# Patient Record
Sex: FEMALE | Race: WHITE | Employment: FULL TIME | URBAN - METROPOLITAN AREA
[De-identification: names, ages, dates, MRNs, and addresses within clinical notes are randomized per-mention and may not be internally consistent; named-entity substitution may affect disease eponyms.]

---

## 2022-08-15 ENCOUNTER — OFFICE VISIT (OUTPATIENT)
Dept: OBGYN CLINIC | Age: 22
End: 2022-08-15
Payer: COMMERCIAL

## 2022-08-15 ENCOUNTER — TELEPHONE (OUTPATIENT)
Dept: OBGYN CLINIC | Age: 22
End: 2022-08-15

## 2022-08-15 VITALS
DIASTOLIC BLOOD PRESSURE: 68 MMHG | WEIGHT: 127.8 LBS | HEIGHT: 61 IN | SYSTOLIC BLOOD PRESSURE: 98 MMHG | BODY MASS INDEX: 24.13 KG/M2

## 2022-08-15 DIAGNOSIS — Z01.419 WELL WOMAN EXAM: Primary | ICD-10-CM

## 2022-08-15 DIAGNOSIS — Z13.89 SCREENING FOR GENITOURINARY CONDITION: ICD-10-CM

## 2022-08-15 DIAGNOSIS — Z11.3 SCREENING EXAMINATION FOR VENEREAL DISEASE: ICD-10-CM

## 2022-08-15 LAB
BILIRUBIN, URINE, POC: NEGATIVE
BLOOD URINE, POC: NEGATIVE
GLUCOSE URINE, POC: NEGATIVE
KETONES, URINE, POC: NEGATIVE
LEUKOCYTE ESTERASE, URINE, POC: NEGATIVE
NITRITE, URINE, POC: NEGATIVE
PH, URINE, POC: 6 (ref 4.6–8)
PROTEIN,URINE, POC: NEGATIVE
SPECIFIC GRAVITY, URINE, POC: 1.02 (ref 1–1.03)
URINALYSIS CLARITY, POC: CLEAR
URINALYSIS COLOR, POC: YELLOW
UROBILINOGEN, POC: NORMAL

## 2022-08-15 PROCEDURE — 81002 URINALYSIS NONAUTO W/O SCOPE: CPT | Performed by: OBSTETRICS & GYNECOLOGY

## 2022-08-15 PROCEDURE — 99385 PREV VISIT NEW AGE 18-39: CPT | Performed by: OBSTETRICS & GYNECOLOGY

## 2022-08-15 RX ORDER — NORETHINDRONE ACETATE AND ETHINYL ESTRADIOL 1MG-20(21)
1 KIT ORAL DAILY
Qty: 3 PACKET | Refills: 4 | Status: SHIPPED | OUTPATIENT
Start: 2022-08-15

## 2022-08-15 RX ORDER — SPIRONOLACTONE 100 MG/1
100 TABLET, FILM COATED ORAL DAILY
COMMUNITY
Start: 2022-07-07

## 2022-08-15 ASSESSMENT — PATIENT HEALTH QUESTIONNAIRE - PHQ9
SUM OF ALL RESPONSES TO PHQ9 QUESTIONS 1 & 2: 0
SUM OF ALL RESPONSES TO PHQ QUESTIONS 1-9: 0
SUM OF ALL RESPONSES TO PHQ QUESTIONS 1-9: 0
1. LITTLE INTEREST OR PLEASURE IN DOING THINGS: 0
2. FEELING DOWN, DEPRESSED OR HOPELESS: 0
SUM OF ALL RESPONSES TO PHQ QUESTIONS 1-9: 0
SUM OF ALL RESPONSES TO PHQ QUESTIONS 1-9: 0

## 2022-08-15 NOTE — PROGRESS NOTES
HPI:  Ms. Meryl Humphrey is a 25 y.o.   OB History          0    Para   0    Term   0       0    AB   0    Living   0         SAB   0    IAB   0    Ectopic   0    Molar   0    Multiple   0    Live Births   0             who is here today for a well woman exam. She complains of none. New patient. First pap. Stopped Depo approximately 6 months ago, prior to this was amenorrheic x5 years with depo provera. 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 GYN History           Patient's last menstrual period was 2022 (exact date). Past Medical History:  Past Medical History:   Diagnosis Date    Acne        Past Surgical History:  No past surgical history on file. Allergies: Allergies   Allergen Reactions    Bactrim [Sulfamethoxazole-Trimethoprim] Swelling     Facial swelling        Medication History:  Current Outpatient Medications   Medication Sig Dispense Refill    spironolactone (ALDACTONE) 100 MG tablet Take 100 mg by mouth in the morning. No current facility-administered medications for this visit.        Social History:  Social History     Socioeconomic History    Marital status: Single     Spouse name: Not on file    Number of children: Not on file    Years of education: Not on file    Highest education level: Not on file   Occupational History    Not on file   Tobacco Use    Smoking status: Never    Smokeless tobacco: Never   Vaping Use    Vaping Use: Never used   Substance and Sexual Activity    Alcohol use: Never    Drug use: Never    Sexual activity: Yes     Partners: Male   Other Topics Concern    Not on file   Social History Narrative    Not on file     Social Determinants of Health     Financial Resource Strain: Not on file Food Insecurity: Not on file   Transportation Needs: Not on file   Physical Activity: Not on file   Stress: Not on file   Social Connections: Not on file   Intimate Partner Violence: Not on file   Housing Stability: Not on file       Family History:  Family History   Problem Relation Age of Onset    Cirrhosis Father        Review of Systems - General ROS: negative except for that discussed in HPI      ROS:  Feeling well. No dyspnea or chest pain on exertion. No abdominal pain, change in bowel habits, black or bloody stools. No urinary tract symptoms. No neurological complaints. Objective:   BP 98/68 (Site: Right Upper Arm, Position: Sitting)   Ht 5' 1\" (1.549 m)   Wt 127 lb 12.8 oz (58 kg)   LMP 08/01/2022 (Exact Date)   BMI 24.15 kg/m²     Results for orders placed or performed in visit on 08/15/22   AMB POC URINALYSIS DIP STICK MANUAL W/O MICRO   Result Value Ref Range    Color (UA POC) Yellow     Clarity (UA POC) Clear     Glucose, Urine, POC Negative Negative    Bilirubin, Urine, POC Negative Negative    Ketones, Urine, POC Negative Negative    Specific Gravity, Urine, POC 1.020 1.001 - 1.035    Blood (UA POC) Negative Negative    pH, Urine, POC 6.0 4.6 - 8.0    Protein, Urine, POC Negative Negative    Urobilinogen, POC Normal     Nitrite, Urine, POC Negative Negative    Leukocyte Esterase, Urine, POC Negative Negative        The patient appears well, alert, oriented x 3, in no distress. ENT normal.  Neck supple. No adenopathy or thyromegaly. Lungs:  clear, good air entry, no wheezes, rhonchi or rales. Heart:  S1 and S2 normal, no murmurs, regular rate and rhythm. Abdomen:  soft without tenderness, guarding, mass or organomegaly. Extremities show no edema, normal peripheral pulses. Neurological is normal, no focal findings.     BREAST EXAM: breasts appear normal, no suspicious masses, no skin or nipple changes or axillary nodes    PELVIC EXAM: External genitalia is within normal limits, urethra, urethra meatus and bladder are midline well supported. Vagina is well rugated, Cervix comes into full view and is within normal limits. Uterus is retro, 6 week size, no ovarian masses palpated    Assessment/Plan:      Diagnosis Orders   1. Well woman exam        2. Screening for genitourinary condition  AMB POC URINALYSIS DIP STICK MANUAL W/O MICRO        Encounter Diagnoses   Name Primary?     Well woman exam Yes    Screening for genitourinary condition      Orders Placed This Encounter   Procedures    AMB POC URINALYSIS DIP STICK MANUAL W/O MICRO       pap smear  return annually or prn  Start low dose pills   Counseling re r/b/a    Brook Greene MA

## 2022-08-18 LAB
C TRACH RRNA CVX QL NAA+PROBE: NEGATIVE
CYTOLOGIST CVX/VAG CYTO: NORMAL
CYTOLOGY CVX/VAG DOC THIN PREP: NORMAL
HPV REFLEX: NORMAL
Lab: NORMAL
N GONORRHOEA RRNA CVX QL NAA+PROBE: NEGATIVE
PATH REPORT.FINAL DX SPEC: NORMAL
STAT OF ADQ CVX/VAG CYTO-IMP: NORMAL
T VAGINALIS RRNA SPEC QL NAA+PROBE: NEGATIVE

## 2023-10-16 ENCOUNTER — TELEPHONE (OUTPATIENT)
Dept: OBGYN CLINIC | Age: 23
End: 2023-10-16

## 2023-10-16 NOTE — TELEPHONE ENCOUNTER
Spoke with Dr Barry Mccollum, ok to bring in for ultrasound if patient is still having pain, if pain is better and still wants ultrasound ok per Dr Barry Mccollum.

## 2023-10-16 NOTE — TELEPHONE ENCOUNTER
Pt LVM stating she was having abdominal pain. Phoned patient to discuss concerns but not answer. Voicemail left notifying patient a [facility] [facility] [employer] message was being sent to discuss her concerns and for her to respond with further information.

## 2025-05-12 NOTE — PROGRESS NOTES
The patient is a 25 y.o.  who is seen to discuss her new pregnancy. Pt denies any current unilateral pain, cramping, urinary symptoms, or vaginal bleeding. She is currently taking an over the counter PNV with DHA and folic acid.     She reports monthly periods q28d.    LMP: 2025  NAVJOT based off of LMP: 2025  GA today: 7w2d    HISTORY:      Patient's last menstrual period was 2025 (exact date).  Sexual History:  has sex with males  Contraception:  none  Current Outpatient Medications on File Prior to Visit   Medication Sig Dispense Refill    Prenatal Vit-Fe Fumarate-FA (PRENATAL VITAMIN PO) Take 1 tablet by mouth daily       No current facility-administered medications on file prior to visit.       ROS:  Feeling well. No dyspnea or chest pain on exertion.  No abdominal pain, change in bowel habits, black or bloody stools.  No urinary tract symptoms. GYN ROS: she complains of missed menses.    PHYSICAL EXAM:  Blood pressure 104/72, height 1.549 m (5' 1\"), weight 57.8 kg (127 lb 6.4 oz), last menstrual period 2025.    The patient appears well, alert, oriented x 3, in no distress.  Exam deferred    ASSESSMENT:  Encounter Diagnoses   Name Primary?    Missed menses Yes    Pregnancy test positive        PLAN:  All questions answered  Diagnosis explained in detail, including differential  US images and findings reviewed with pt.   Reassured of IUP with + HPE=203 c/w dates by lmp   YS seen  OB booklet given. Reviewed OTC meds and genetic screening options at length.   Pain/bleeding precautions   Rtc 2 wks nob talk/labs and FU US to eval growth interval  Continue pnv    Orders Placed This Encounter   Procedures    AMB POC URINE PREGNANCY TEST, VISUAL COLOR COMPARISON       Supervising physician is Dr. Oglesby.  Greater than 50% of the 30 minute visit were spent in counseling to the above topics.

## 2025-05-13 ENCOUNTER — PROCEDURE VISIT (OUTPATIENT)
Dept: OBGYN CLINIC | Age: 25
End: 2025-05-13
Payer: COMMERCIAL

## 2025-05-13 ENCOUNTER — OFFICE VISIT (OUTPATIENT)
Dept: OBGYN CLINIC | Age: 25
End: 2025-05-13
Payer: COMMERCIAL

## 2025-05-13 VITALS
BODY MASS INDEX: 24.05 KG/M2 | WEIGHT: 127.4 LBS | DIASTOLIC BLOOD PRESSURE: 72 MMHG | HEIGHT: 61 IN | SYSTOLIC BLOOD PRESSURE: 104 MMHG

## 2025-05-13 DIAGNOSIS — Z32.01 PREGNANCY TEST POSITIVE: ICD-10-CM

## 2025-05-13 DIAGNOSIS — N92.6 MISSED MENSES: Primary | ICD-10-CM

## 2025-05-13 LAB
HCG, PREGNANCY, URINE, POC: POSITIVE
VALID INTERNAL CONTROL, POC: YES

## 2025-05-13 PROCEDURE — 76830 TRANSVAGINAL US NON-OB: CPT | Performed by: STUDENT IN AN ORGANIZED HEALTH CARE EDUCATION/TRAINING PROGRAM

## 2025-05-13 PROCEDURE — 81025 URINE PREGNANCY TEST: CPT | Performed by: NURSE PRACTITIONER

## 2025-05-13 PROCEDURE — 99214 OFFICE O/P EST MOD 30 MIN: CPT | Performed by: NURSE PRACTITIONER

## 2025-05-13 NOTE — PROGRESS NOTES
The patient is a 25 y.o.  who is seen to discuss her new pregnancy. Pt denies any current unilateral pain, cramping, urinary symptoms, or vaginal bleeding. She is currently taking an over the counter PNV with DHA and folic acid.     She reports monthly periods q28d.    LMP: 2025  NAVJOT based off of LMP: 2025  GA today: 7w2d    US Findings from Today (2025):  Transvaginal Preg Confirmation   FHT= 126 bpm   CRL=LMP   YS- slightly irreg shape   ROV- clc 2.0 x 1.4 x 1.5 cm   LOV- WNL   UT- retroverted and heterogeneous, no mirna visualized   CX- WNL         HISTORY:      Patient's last menstrual period was 2025 (exact date).  Sexual History:  has sex with males  Contraception:  none  Current Outpatient Medications on File Prior to Visit   Medication Sig Dispense Refill    Prenatal Vit-Fe Fumarate-FA (PRENATAL VITAMIN PO) Take 1 tablet by mouth daily       No current facility-administered medications on file prior to visit.       ROS:  Feeling well. No dyspnea or chest pain on exertion.  No abdominal pain, change in bowel habits, black or bloody stools.  No urinary tract symptoms. GYN ROS: she complains of missed menses.    PHYSICAL EXAM:  Blood pressure 104/72, height 1.549 m (5' 1\"), weight 57.8 kg (127 lb 6.4 oz), last menstrual period 2025.    The patient appears well, alert, oriented x 3, in no distress.  Exam deferred    ASSESSMENT:  Encounter Diagnoses   Name Primary?    Missed menses Yes    Pregnancy test positive        PLAN:  All questions answered  Diagnosis explained in detail, including differential  US images and findings reviewed with pt.   Reassured of IUP with + XLX=762 c/w dates by lmp   YS seen  OB booklet given. Reviewed OTC meds and genetic screening options at length.   Pain/bleeding precautions   Rtc 2 wks nob talk/labs and FU US to eval growth interval  Continue pnv    Orders Placed This Encounter   Procedures    AMB POC URINE PREGNANCY TEST, VISUAL COLOR

## 2025-05-14 ENCOUNTER — RESULTS FOLLOW-UP (OUTPATIENT)
Dept: OBGYN CLINIC | Age: 25
End: 2025-05-14